# Patient Record
Sex: MALE | Race: WHITE
[De-identification: names, ages, dates, MRNs, and addresses within clinical notes are randomized per-mention and may not be internally consistent; named-entity substitution may affect disease eponyms.]

---

## 2020-10-26 ENCOUNTER — HOSPITAL ENCOUNTER (EMERGENCY)
Dept: HOSPITAL 4 - SED | Age: 20
Discharge: HOME | End: 2020-10-26
Payer: MEDICAID

## 2020-10-26 VITALS — SYSTOLIC BLOOD PRESSURE: 130 MMHG

## 2020-10-26 VITALS — HEIGHT: 72 IN | WEIGHT: 155 LBS | BODY MASS INDEX: 20.99 KG/M2

## 2020-10-26 DIAGNOSIS — H66.92: Primary | ICD-10-CM

## 2020-10-26 DIAGNOSIS — Z88.6: ICD-10-CM

## 2020-10-26 DIAGNOSIS — Z88.1: ICD-10-CM

## 2020-10-26 NOTE — NUR
Patient given written and verbal discharge instructions and verbalizes 
understanding.  ER MD discussed with patient the results and treatment 
provided. Patient in stable condition. ID arm band removed. 

Rx of Biaxin Filmtab given. Patient educated on pain management and to follow 
up with PMD. Pain Scale 0.

Opportunity for questions provided and answered. Medication side effect fact 
sheet provided.